# Patient Record
Sex: FEMALE | Race: ASIAN | NOT HISPANIC OR LATINO | URBAN - METROPOLITAN AREA
[De-identification: names, ages, dates, MRNs, and addresses within clinical notes are randomized per-mention and may not be internally consistent; named-entity substitution may affect disease eponyms.]

---

## 2022-02-04 ENCOUNTER — EMERGENCY (EMERGENCY)
Facility: HOSPITAL | Age: 37
LOS: 1 days | Discharge: HOME | End: 2022-02-04
Attending: EMERGENCY MEDICINE | Admitting: EMERGENCY MEDICINE
Payer: COMMERCIAL

## 2022-02-04 VITALS
DIASTOLIC BLOOD PRESSURE: 73 MMHG | RESPIRATION RATE: 18 BRPM | SYSTOLIC BLOOD PRESSURE: 104 MMHG | HEART RATE: 74 BPM | OXYGEN SATURATION: 99 % | TEMPERATURE: 98 F

## 2022-02-04 PROCEDURE — 99284 EMERGENCY DEPT VISIT MOD MDM: CPT

## 2022-02-04 RX ORDER — OXYCODONE HYDROCHLORIDE 5 MG/1
5 TABLET ORAL ONCE
Refills: 0 | Status: DISCONTINUED | OUTPATIENT
Start: 2022-02-04 | End: 2022-02-04

## 2022-02-04 RX ADMIN — OXYCODONE HYDROCHLORIDE 5 MILLIGRAM(S): 5 TABLET ORAL at 23:59

## 2022-02-04 NOTE — ED PROVIDER NOTE - OBJECTIVE STATEMENT
Dr Sexton  41yo F hx HTN sp fall pta. pt slipped going down stairs, slipped and fell backwards. hit lower back on step, no head trauma no LOC. no cp Endorse pain of the lower back worse w laying down and movement. no numbness or weakness. no AC> no cp or abdominal pain

## 2022-02-04 NOTE — ED PROVIDER NOTE - NSFOLLOWUPINSTRUCTIONS_ED_ALL_ED_FT
You were seen in the Emergency Department with back pain. You were diagnosed with a lumbar transverse process fracture and given pain medications. Please follow up with the Spine clinic (number below). Return to the Emergency Department if you have increasing pain, numbness, tingling, urinary incontinence. Use Tylenol 650 mg or Motrin 600 mg for pain. You were seen in the Emergency Department with back pain. You were diagnosed with a lumbar transverse process fracture and given pain medications. Please follow up with the Spine clinic (number below). Return to the Emergency Department if you have increasing pain, numbness, tingling, urinary incontinence. Use Tylenol 650 mg or Motrin 600 mg for pain.    We’ve got you covered from head to toe  [facility] [phone]   [email or web address]  [address] [place] [address], [address] [place] [address]  Phone: [phone]    [place]  [address] [place] [address]  Phone: [phone]    [place]  [address] [place] [address]  Phone: [phone]    [place]  [address] [place] [address]  Phone: [phone]

## 2022-02-04 NOTE — ED PROVIDER NOTE - PATIENT PORTAL LINK FT
You can access the FollowMyHealth Patient Portal offered by [facility] by registering at the following website: [email or web address]. By joining [facility]’s FollowMyHealth portal, you will also be able to view your health information using other applications (apps) compatible with our system.

## 2022-02-04 NOTE — ED ADULT TRIAGE NOTE - CHIEF COMPLAINT QUOTE
pt c/o having lower back pain starting tonight after slipping and falling down 4 steps. denies hitting head or LOC. Denies PMH

## 2022-02-04 NOTE — ED PROVIDER NOTE - PHYSICAL EXAMINATION
Dr Sexton  nontoxic female no sig of head/facial trauma    no work of breathing.   nontender thoracic spine. tender in the lower lumbar  spine w a superficial abrasio no bleeding. no ecchymosis   stable pelvis  soft nontender abdomen. no  rebound. no guarding. no sign of trauma. no CVAT

## 2022-02-05 VITALS
DIASTOLIC BLOOD PRESSURE: 73 MMHG | HEART RATE: 63 BPM | OXYGEN SATURATION: 100 % | SYSTOLIC BLOOD PRESSURE: 113 MMHG | TEMPERATURE: 98 F | RESPIRATION RATE: 18 BRPM

## 2022-02-05 PROCEDURE — 72100 X-RAY EXAM L-S SPINE 2/3 VWS: CPT | Mod: 26

## 2022-02-05 RX ORDER — OXYCODONE HYDROCHLORIDE 5 MG/1
1 TABLET ORAL
Qty: 10 | Refills: 0
Start: 2022-02-05 | End: 2022-02-07

## 2022-02-05 NOTE — ED ADULT NURSE NOTE - OBJECTIVE STATEMENT
pt received to 12a  , a&ox4 , ambulatory , p/w slip and fall on the ice. pt states she had been experiencing lower back pain. Pt breathing even and unlabored on room air.  Denies fever, chills, cough, SOB, chest pain, palpitations, dizziness, N/V/D, constipation, numbness, tingling. pt educated on fall precautions and confirms understanding via teach back method. Stretcher locked in lowest position with siderails up x2. Call bell and personal items within reach.

## 2022-02-05 NOTE — ED ADULT NURSE NOTE - NSIMPLEMENTINTERV_GEN_ALL_ED
Implemented All Fall Risk Interventions:  [place] to call system. Call bell, personal items and telephone within reach. Instruct patient to call for assistance. Room bathroom lighting operational. Non-slip footwear when patient is off stretcher. Physically safe environment: no spills, clutter or unnecessary equipment. Stretcher in lowest position, wheels locked, appropriate side rails in place. Provide visual cue, wrist band, yellow gown, etc. Monitor gait and stability. Monitor for mental status changes and reorient to person, place, and time. Review medications for side effects contributing to fall risk. Reinforce activity limits and safety measures with patient and family.

## 2022-02-09 ENCOUNTER — NON-APPOINTMENT (OUTPATIENT)
Age: 37
End: 2022-02-09

## 2022-02-09 ENCOUNTER — APPOINTMENT (OUTPATIENT)
Dept: PHYSICAL MEDICINE AND REHAB | Facility: CLINIC | Age: 37
End: 2022-02-09
Payer: COMMERCIAL

## 2022-02-09 DIAGNOSIS — S32.009A UNSPECIFIED FRACTURE OF UNSPECIFIED LUMBAR VERTEBRA, INITIAL ENCOUNTER FOR CLOSED FRACTURE: ICD-10-CM

## 2022-02-09 PROBLEM — Z00.00 ENCOUNTER FOR PREVENTIVE HEALTH EXAMINATION: Status: ACTIVE | Noted: 2022-02-09

## 2022-02-09 PROCEDURE — 99203 OFFICE O/P NEW LOW 30 MIN: CPT

## 2022-02-09 RX ORDER — VALACYCLOVIR HYDROCHLORIDE 1 G/1
1 TABLET, FILM COATED ORAL
Refills: 0 | Status: ACTIVE | COMMUNITY

## 2022-02-09 NOTE — PHYSICAL EXAM
[Normal] : Deep tendon reflexes were 2+ and symmetric, the sensory exam was normal to light touch and pinprick and no focal deficits [de-identified] : tenderness to palpation over midline lumbar spine [de-identified] : healing scar

## 2022-02-09 NOTE — DATA REVIEWED
[Plain X-Rays] : plain X-Rays [FreeTextEntry1] : ACC: [id number] EXAM: XR LS SPINE AP LAT 2-3 VIEWS\par \par PROCEDURE DATE: 02/05/2022\par \par \par \par INTERPRETATION: CLINICAL INFORMATION: Fall onto back, pain.\par \par EXAM: 3 views of the lumbar spine\par \par COMPARISON: No available imaging for comparison.\par \par FINDINGS:\par No compression fractures, spondylolistheses, or spondylolysis defects.\par \par Numbering is based on presumed small rudimentary 12th ribs.\par \par There is an oblique lucency in the L3 right transverse process suspicious for acute fracture.\par \par Disk space heights preserved and facet alignment maintained.\par \par Unremarkable SI joints and partially visualized hips.\par \par No lytic or blastic lesions.\par \par IMPRESSION:\par Mildly displaced right L3 transverse process fracture.\par \par --- End of Report ---\par \par \par \par \par  HOLLAND LEMUS MD; Resident Interventional Radiology\par This document has been electronically signed.\par DINORAH LEBRON MD; Attending Radiologist\par This document has been electronically signed. Feb 5 2022 9:55AM

## 2022-02-09 NOTE — HISTORY OF PRESENT ILLNESS
[Back] : back [___ days] : [unfilled] day(s) ago [3] : a current pain level of 3/10 [5] : a maximum pain level of 5/10 [Sharp] : sharp [Medications] : medications [FreeTextEntry1] : fall on her stairs a few days ago [FreeTextEntry3] : turning her back, getting up quickly [FreeTextEntry4] : Tylenol, ibuprofen [FreeTextEntry6] : Tylenol, ibuprofen

## 2022-02-09 NOTE — ASSESSMENT
[FreeTextEntry1] : 36 year old female with low back pain and lumbar transverse process fracture secondary to fall.  I will prescribe a brace for her lower back and advised her that it may take 8-12 weeks for her fracture to heal.  I have also recommended that she avoid any high-impact activity that may aggravate her back.  She will follow up with me in 2 months to assess her progress.

## 2024-01-08 ENCOUNTER — NON-APPOINTMENT (OUTPATIENT)
Age: 39
End: 2024-01-08

## 2024-01-08 ENCOUNTER — APPOINTMENT (OUTPATIENT)
Dept: ORTHOPEDIC SURGERY | Facility: CLINIC | Age: 39
End: 2024-01-08
Payer: COMMERCIAL

## 2024-01-08 VITALS — WEIGHT: 127 LBS | BODY MASS INDEX: 22.5 KG/M2 | HEIGHT: 63 IN

## 2024-01-08 DIAGNOSIS — M25.812 OTHER SPECIFIED JOINT DISORDERS, LEFT SHOULDER: ICD-10-CM

## 2024-01-08 PROCEDURE — 20610 DRAIN/INJ JOINT/BURSA W/O US: CPT | Mod: LT

## 2024-01-08 PROCEDURE — 99204 OFFICE O/P NEW MOD 45 MIN: CPT | Mod: 25

## 2024-01-08 NOTE — HISTORY OF PRESENT ILLNESS
[de-identified] : Patient presents with left shoulder pain that started about 6 months ago, without inciting injury. She reports then on 11/28/23 she fell down 3 steps at home and landed sitting. She states she felt the left shoulder pain worsened as a result. Patient localizes the pain to the posterior aspect of left shoulder and left upper arm. She has difficulty with abduction and reaching behind her back, and is not able to do shoulder press at the gym. Denies any numbness or tingling. She takes Meloxicam for pain as needed daily, which somewhat helps.

## 2024-01-08 NOTE — CONSULT LETTER
[Dear  ___] : Dear  [unfilled], [Consult Letter:] : I had the pleasure of evaluating your patient, [unfilled]. [Please see my note below.] : Please see my note below. [Sincerely,] : Sincerely, [FreeTextEntry2] : PCP [FreeTextEntry3] : Shady Sebastian DO, [facility] [place]

## 2024-01-08 NOTE — PHYSICAL EXAM
[de-identified] : Constitutional: Well-nourished, well-developed, No acute distress Respiratory:  Good respiratory effort, no SOB Psychiatric: Pleasant and normal affect, alert and oriented x3 Skin: Clean dry and intact B/L UE Musculoskeletal: normal except where as noted in regional exam     Left Shoulder: APPEARANCE: no marked deformities, no swelling or malalignment POSITIVE TENDERNESS: supraspinatus, long head biceps tendon NONTENDER:  infraspinatus, teres minor. biceps. anterior and posterior capsule. AC joint. ROM: full with mild painful arc past 60 degrees, no scapular winging or dyskinesia present RESISTIVE TESTING: MMT 4+/5 ER, Flexion and Empty can, 5/5 IR. painless 5/5 resisted ext, horizontal abd/add SPECIAL TESTS: + Babin and Neers, mildly + cross arm adduction, + Speeds, neg Bills's, neg Drop Arm, neg Apprehension. neg apley's scratch test

## 2024-01-08 NOTE — DISCUSSION/SUMMARY
[de-identified] : Discussed findings of today's exam and possible causes of patient's pain.  Educated patient on their most probable diagnosis of chronic intermittent left shoulder pain with recent atraumatic exacerbation due to subacromial impingement.  Reviewed possible courses of treatment, and we collaboratively decided best course of treatment at this time will include conservative management.  Patient has no evidence of high-grade rotator cuff tear or rupture, no surgical indications, no need for MRI at this time.  Patient is already tried meloxicam regularly as prescribed by her PCP, this has not alleviated her pain.  We discussed various treatment options as well as associated risk/benefits/alternatives and patient elected to proceed with cortisone injection today (see procedure note).  Informed the patient that the numbing medicine in today's injection will last for about 4-6 hours. The steroid that was injected will start to work in 1 to 2 days, peak at 1-2 weeks, and may last up to 1-2 months.  Patient will be started on a course of physical therapy to restore normal range of motion and strength as tolerated.  Patient is an 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  Follow up as needed.  Patient appreciates and agrees with current plan.    This note was generated using dragon medical dictation software.  A reasonable effort has been made for proofreading its contents, but typos may still remain.  If there are any questions or points of clarification needed please notify my office.

## 2024-01-08 NOTE — PROCEDURE
[de-identified] : Injection: Left  Shoulder Subacromial Space. Indication: Impingement.  A discussion was had with the patient regarding this procedure and all questions were answered. All risks, benefits and alternatives were discussed. These included but were not limited to bleeding, infection, and allergic reaction.  A timeout was done to ensure correct side and patient agreed to the procedure.  A [place] yanet was created on the skin utilizing a plastic needle cap to yanet the anticipated point of entry. Alcohol was used to clean the skin, and Betadine was used to sterilize and prep the area in the posterior aspect of the shoulder. Ethyl chloride spray was then used as a topical anesthetic. A 22-gauge 1.5" needle was used to inject 2cc of 0.25% bupivacaine without epinephrine and 1cc of 40mg/ml methylprednisolone into the subacromial space. A sterile bandage was then applied. The patient tolerated the procedure well and there were no complications.